# Patient Record
Sex: MALE | Race: WHITE | NOT HISPANIC OR LATINO | Employment: FULL TIME | URBAN - NONMETROPOLITAN AREA
[De-identification: names, ages, dates, MRNs, and addresses within clinical notes are randomized per-mention and may not be internally consistent; named-entity substitution may affect disease eponyms.]

---

## 2021-12-20 ENCOUNTER — HOSPITAL ENCOUNTER (OUTPATIENT)
Dept: GENERAL RADIOLOGY | Facility: HOSPITAL | Age: 51
Discharge: HOME | End: 2021-12-20

## 2021-12-20 ENCOUNTER — TRANSCRIBE ORDERS (OUTPATIENT)
Dept: ADMINISTRATIVE | Facility: HOSPITAL | Age: 51
End: 2021-12-20

## 2021-12-20 DIAGNOSIS — Z02.1 PRE-EMPLOYMENT EXAMINATION: ICD-10-CM

## 2021-12-20 DIAGNOSIS — Z02.1 PRE-EMPLOYMENT EXAMINATION: Primary | ICD-10-CM

## 2021-12-20 PROCEDURE — 71045 X-RAY EXAM CHEST 1 VIEW: CPT

## 2022-07-08 ENCOUNTER — TRANSCRIBE ORDERS (OUTPATIENT)
Dept: ADMINISTRATIVE | Facility: HOSPITAL | Age: 52
End: 2022-07-08

## 2022-07-08 DIAGNOSIS — R22.9 SUBCUTANEOUS MASS: Primary | ICD-10-CM

## 2022-07-18 ENCOUNTER — HOSPITAL ENCOUNTER (OUTPATIENT)
Dept: ULTRASOUND IMAGING | Facility: HOSPITAL | Age: 52
Discharge: HOME | End: 2022-07-18
Admitting: INTERNAL MEDICINE

## 2022-07-18 DIAGNOSIS — R22.9 SUBCUTANEOUS MASS: ICD-10-CM

## 2022-07-18 PROCEDURE — 76536 US EXAM OF HEAD AND NECK: CPT

## 2024-01-17 ENCOUNTER — APPOINTMENT (OUTPATIENT)
Dept: GENERAL RADIOLOGY | Facility: HOSPITAL | Age: 54
End: 2024-01-17
Payer: COMMERCIAL

## 2024-01-17 ENCOUNTER — HOSPITAL ENCOUNTER (EMERGENCY)
Facility: HOSPITAL | Age: 54
Discharge: HOME | End: 2024-01-17
Admitting: EMERGENCY MEDICINE
Payer: COMMERCIAL

## 2024-01-17 VITALS
WEIGHT: 195 LBS | OXYGEN SATURATION: 96 % | HEART RATE: 112 BPM | BODY MASS INDEX: 26.41 KG/M2 | HEIGHT: 72 IN | RESPIRATION RATE: 18 BRPM | DIASTOLIC BLOOD PRESSURE: 79 MMHG | TEMPERATURE: 98.9 F | SYSTOLIC BLOOD PRESSURE: 136 MMHG

## 2024-01-17 DIAGNOSIS — J10.1 INFLUENZA A: Primary | ICD-10-CM

## 2024-01-17 LAB
FLUAV RNA RESP QL NAA+PROBE: DETECTED
FLUBV RNA RESP QL NAA+PROBE: NOT DETECTED
S PYO AG THROAT QL: NEGATIVE
SARS-COV-2 RNA RESP QL NAA+PROBE: NOT DETECTED

## 2024-01-17 PROCEDURE — 99283 EMERGENCY DEPT VISIT LOW MDM: CPT

## 2024-01-17 PROCEDURE — 87081 CULTURE SCREEN ONLY: CPT

## 2024-01-17 PROCEDURE — 71046 X-RAY EXAM CHEST 2 VIEWS: CPT

## 2024-01-17 PROCEDURE — 87147 CULTURE TYPE IMMUNOLOGIC: CPT

## 2024-01-17 PROCEDURE — 87636 SARSCOV2 & INF A&B AMP PRB: CPT

## 2024-01-17 PROCEDURE — 87880 STREP A ASSAY W/OPTIC: CPT

## 2024-01-17 RX ORDER — GUAIFENESIN 200 MG/10ML
200 LIQUID ORAL EVERY 4 HOURS PRN
Qty: 180 ML | Refills: 0 | Status: SHIPPED | OUTPATIENT
Start: 2024-01-17

## 2024-01-17 NOTE — DISCHARGE INSTRUCTIONS
It was very nice to meet you, Ashvin. Thank you for allowing us to take care of you today at [facility].    Today you were seen in the emergency department for your symptoms. Please understand that an ER evaluation is just the start of your evaluation. We do the best we can, but we are often unable to fully find what is causing your symptoms from one evaluation.  Because of this, the goal is to determine whether you need to be evaluated in the hospital or if it is safe for you to go home and see other doctors provided such as primary care physicians or specialist on an outpatient basis.     Like we discussed, I strongly urge that you follow up with your primary care doctor. Please call their office to set up an appointment as soon as possible so that you can be re-evaluated for improvement in your symptoms or for any other questions.  I have provided the information needed, including phone number, to call to set up an appointment below in these discharge papers.     Educational material has also been provided in the following pages regarding what we have discussed today.    MEDICATIONS PRESCRIBED: Please increase your oral intake to prevent any dehydration.  Use Tylenol and/or Motrin as needed for pain relief and fever control.  I have prescribed    Please return to the emergency room within 12-48 hours if you experience symptoms such as the following:   Fever, chills, chest pain or shortness of breath, pain with inspiration/expiration, pain that travels to your arms, neck or back, nausea, vomiting, severe headache, tearing pain in your chest, dizziness, feel as though you are about to pass out, OR if you have any worsening symptoms, or any other concerns.

## 2024-01-17 NOTE — Clinical Note
[facility] EMERGENCY DEPARTMENT  [address]  [facility] [address]  Phone: [phone]    Ashvin Beverly was seen and treated in our emergency department on 1/17/2024.  He may return to work on 01/22/2024.         Thank you for choosing [facility].    James Ruiz, APRN

## 2024-01-17 NOTE — ED PROVIDER NOTES
Subjective   History of Present Illness  Patient is a 53-year-old male that presents to the emergency department for low-grade fever, body aches, chills, dry cough, and sore throat.  Patient reports all of the symptoms began Monday night.  He denies any known sick exposure.  Patient denies any chest pain, shortness of breath, abdominal pain, nausea, vomiting, constipation, or diarrhea.  Denies any lightheadedness, dizziness, blurred vision, or near syncope.      Review of Systems   Constitutional:  Positive for chills, fatigue and fever.        Body aches   HENT:  Positive for congestion, ear discharge, rhinorrhea, sinus pressure and sore throat.    Respiratory:  Positive for cough.    All other systems reviewed and are negative.      History reviewed. No pertinent past medical history.    No Known Allergies    History reviewed. No pertinent surgical history.    History reviewed. No pertinent family history.    Social History     Socioeconomic History    Marital status: Single           Objective   Physical Exam  Vitals and nursing note reviewed.   Constitutional:       Appearance: Normal appearance. He is well-developed.      Comments: Nontoxic appearing. In no acute distress.    HENT:      Head: Normocephalic and atraumatic.      Jaw: There is normal jaw occlusion.      Right Ear: External ear normal. Drainage present. No swelling or tenderness. A middle ear effusion is present. Tympanic membrane is bulging. Tympanic membrane is not erythematous.      Left Ear: External ear normal. Drainage present. No swelling or tenderness. A middle ear effusion is present. Tympanic membrane is bulging. Tympanic membrane is not erythematous.      Nose: Nose normal. Congestion and rhinorrhea present. Rhinorrhea is clear.      Mouth/Throat:      Lips: Pink.      Mouth: Mucous membranes are moist.      Pharynx: Oropharynx is clear. Uvula midline. Posterior oropharyngeal erythema present. No pharyngeal swelling, oropharyngeal  exudate or uvula swelling.      Tonsils: No tonsillar exudate or tonsillar abscesses. 0 on the right. 0 on the left.   Eyes:      Extraocular Movements: Extraocular movements intact.      Conjunctiva/sclera: Conjunctivae normal.      Pupils: Pupils are equal, round, and reactive to light.   Cardiovascular:      Rate and Rhythm: Regular rhythm. Tachycardia present.      Pulses: Normal pulses.      Heart sounds: Normal heart sounds.   Pulmonary:      Effort: Pulmonary effort is normal. No respiratory distress.      Breath sounds: Normal breath sounds. No wheezing.   Chest:      Chest wall: No tenderness.   Abdominal:      General: Abdomen is flat. Bowel sounds are normal. There is no distension.      Palpations: Abdomen is soft.      Tenderness: There is no abdominal tenderness. There is no right CVA tenderness, left CVA tenderness, guarding or rebound.   Musculoskeletal:         General: Normal range of motion.      Cervical back: Normal range of motion and neck supple. No rigidity or tenderness.      Right lower leg: No edema.      Left lower leg: No edema.   Lymphadenopathy:      Cervical: No cervical adenopathy.   Skin:     General: Skin is warm and dry.      Capillary Refill: Capillary refill takes less than 2 seconds.   Neurological:      General: No focal deficit present.      Mental Status: He is alert and oriented to person, place, and time. Mental status is at baseline.   Psychiatric:         Mood and Affect: Mood normal.         Behavior: Behavior normal.         Thought Content: Thought content normal.         Judgment: Judgment normal.       XR Chest 2 View   Final Result   1. No acute disease.       This report was signed and finalized on 1/17/2024 10:46 AM by Dr. Lopez Kerns MD.             Labs Reviewed   COVID-19 AND FLU A/B, NP SWAB IN TRANSPORT MEDIA 1 HR TAT - Abnormal; Notable for the following components:       Result Value    Influenza A PCR Detected (*)     All other components within  normal limits    Narrative:     Fact sheet for providers: https://www.fda.gov/media/626329/download    Fact sheet for patients: https://www.fda.gov/media/858842/download    Test performed by PCR.   RAPID STREP A SCREEN - Normal   COVID PRE-OP / PRE-PROCEDURE SCREENING ORDER (NO ISOLATION)    Narrative:     The following orders were created for panel order COVID PRE-OP / PRE-PROCEDURE SCREENING ORDER (NO ISOLATION) - Swab, Nasopharynx.  Procedure                               Abnormality         Status                     ---------                               -----------         ------                     COVID-19 and FLU A/B PCR...[[id number]]  Abnormal            Final result                 Please view results for these tests on the individual orders.   BETA HEMOLYTIC STREP CULTURE, THROAT      Procedures           ED Course                                             Medical Decision Making  Ashvin Beverly is a 53 y.o. male who presents to the ED for low-grade fever, body aches, chills, dry cough, and sore throat.  Patient reports all of the symptoms began Monday night.  He denies any known sick exposure.  Patient denies any chest pain, shortness of breath, abdominal pain, nausea, vomiting, constipation, or diarrhea.  Denies any lightheadedness, dizziness, blurred vision, or near syncope.    Patient was non-toxic appearing on arrival. No acute distress was noted.  Vital signs stable.  Tachycardic upon arrival.    Past medical history, surgical history, and medication regimen reviewed.     Patient's presentation raises suspicion for differentials including, but not limited to, COVID, flu, strep pharyngitis, pneumonia, sinusitis.    Please refer to above section of note for lab and imaging results that were reviewed and interpreted by radiology as well as attending physician.     Given findings described above, patient's presentation is likely consistent with influenza A. I have a low suspicion for pneumonia,  COVID, or strep pharyngitis at this point in their ED course.      I had an in-depth discussion with the patient regarding x-ray imaging revealing no evidence of pneumonia and respiratory panel revealing a positive result for influenza A.  I discussed that patient is right on the edge of the 48-hour tenisha but I did offer prescription for Tamiflu we discussed possible side effects and patient states he does not want a prescription for this.  Patient requesting something to help with cough.  I discussed that patient be discharged with cough medicine such as Robitussin that he may use.  We also discussed that treatment for viral illness is based on symptom management, therefore patient will need to increase his oral intake to prevent any dehydration in addition to administering Tylenol and/or Motrin as needed for pain relief and fever. I answered all the questions regarding the emergency department evaluation, diagnosis, and treatment plan in plain and simple language that was understandable. We discussed that due to always having some diagnostic uncertainty while in the ER, there is always a chance that symptoms may change or new symptoms may reveal themselves after being discharged. Because of this, I stressed the importance of Ashvin following up with their PCP. Patient informed that appointment will need to be done by calling their office to set up an appointment within the next few days or as soon as reasonably possible so that the symptoms can be re-evaluated for improvement or for any other questions. I also gave Ashvin common sense return precautions and prompted patient to return to the emergency department within 24 - 48hrs if there are any new, worsening, or concerning symptoms. The patient verbalized understanding of the discharge instructions and agreed with them. Ashvin was discharged in stable condition.     Dragon disclaimer:  Parts of this note may be an electronic transcription/translation of spoken  language to printed text using the Dragon dictation system.       Problems Addressed:  Influenza A: complicated acute illness or injury    Amount and/or Complexity of Data Reviewed  Radiology: ordered.    Risk  OTC drugs.        Final diagnoses:   Influenza A       ED Disposition  ED Disposition       ED Disposition   Discharge    Condition   Stable    Comment   --               Yahir Garibay MD  [address] [facility] [address]  [facility] [address]  [phone]    Schedule an appointment as soon as possible for a visit       [facility] EMERGENCY DEPARTMENT  [address] [place] [address] [phone]    If symptoms worsen         Medication List        New Prescriptions      guaifenesin 100 MG/5ML liquid  Commonly known as: ROBITUSSIN  Take 10 mL by mouth Every 4 (Four) Hours As Needed for Cough.               Where to Get Your Medications        These medications were sent to [facility]/pharmacy #[id number] - [place], [place] - [address] - [phone]  - [phone] fx [address] [facility] [address]      Phone: [phone]   guaifenesin 100 MG/5ML liquid            James Ruiz, APRN  01/17/24 1301

## 2024-01-20 LAB — BACTERIA SPEC AEROBE CULT: ABNORMAL
